# Patient Record
Sex: MALE | Race: WHITE | Employment: STUDENT | URBAN - METROPOLITAN AREA
[De-identification: names, ages, dates, MRNs, and addresses within clinical notes are randomized per-mention and may not be internally consistent; named-entity substitution may affect disease eponyms.]

---

## 2019-09-09 ENCOUNTER — OFFICE VISIT (OUTPATIENT)
Dept: FAMILY MEDICINE CLINIC | Age: 34
End: 2019-09-09
Payer: MEDICARE

## 2019-09-09 VITALS
OXYGEN SATURATION: 97 % | HEIGHT: 72 IN | WEIGHT: 253.4 LBS | SYSTOLIC BLOOD PRESSURE: 120 MMHG | HEART RATE: 81 BPM | DIASTOLIC BLOOD PRESSURE: 86 MMHG | TEMPERATURE: 97.7 F | RESPIRATION RATE: 12 BRPM | BODY MASS INDEX: 34.32 KG/M2

## 2019-09-09 DIAGNOSIS — Z87.828 HISTORY OF DISRUPTION OF MEDIAL COLLATERAL LIGAMENT: ICD-10-CM

## 2019-09-09 DIAGNOSIS — E11.9 TYPE 2 DIABETES MELLITUS WITHOUT COMPLICATION, WITHOUT LONG-TERM CURRENT USE OF INSULIN (HCC): ICD-10-CM

## 2019-09-09 DIAGNOSIS — S39.012A STRAIN OF LUMBAR REGION, INITIAL ENCOUNTER: ICD-10-CM

## 2019-09-09 DIAGNOSIS — E11.9 TYPE 2 DIABETES MELLITUS WITHOUT COMPLICATION, WITHOUT LONG-TERM CURRENT USE OF INSULIN (HCC): Primary | ICD-10-CM

## 2019-09-09 PROBLEM — R00.1 BRADYCARDIA: Status: ACTIVE | Noted: 2019-01-10

## 2019-09-09 LAB
ALBUMIN SERPL-MCNC: 4.5 G/DL (ref 3.5–4.6)
ALP BLD-CCNC: 56 U/L (ref 35–104)
ALT SERPL-CCNC: 40 U/L (ref 0–41)
ANION GAP SERPL CALCULATED.3IONS-SCNC: 13 MEQ/L (ref 9–15)
AST SERPL-CCNC: 20 U/L (ref 0–40)
BASOPHILS ABSOLUTE: 0.1 K/UL (ref 0–0.2)
BASOPHILS RELATIVE PERCENT: 0.7 %
BILIRUB SERPL-MCNC: 0.7 MG/DL (ref 0.2–0.7)
BUN BLDV-MCNC: 11 MG/DL (ref 6–20)
CALCIUM SERPL-MCNC: 9.4 MG/DL (ref 8.5–9.9)
CHLORIDE BLD-SCNC: 105 MEQ/L (ref 95–107)
CHOLESTEROL, TOTAL: 175 MG/DL (ref 0–199)
CO2: 25 MEQ/L (ref 20–31)
CREAT SERPL-MCNC: 0.92 MG/DL (ref 0.7–1.2)
CREATININE URINE: 130.8 MG/DL
EOSINOPHILS ABSOLUTE: 0.4 K/UL (ref 0–0.7)
EOSINOPHILS RELATIVE PERCENT: 5.3 %
GFR AFRICAN AMERICAN: >60
GFR NON-AFRICAN AMERICAN: >60
GLOBULIN: 2.6 G/DL (ref 2.3–3.5)
GLUCOSE BLD-MCNC: 95 MG/DL (ref 70–99)
HBA1C MFR BLD: 5.3 % (ref 4.8–5.9)
HCT VFR BLD CALC: 50.1 % (ref 42–52)
HDLC SERPL-MCNC: 39 MG/DL (ref 40–59)
HEMOGLOBIN: 16.8 G/DL (ref 14–18)
LDL CHOLESTEROL CALCULATED: 117 MG/DL (ref 0–129)
LYMPHOCYTES ABSOLUTE: 2.1 K/UL (ref 1–4.8)
LYMPHOCYTES RELATIVE PERCENT: 26.7 %
MCH RBC QN AUTO: 31.7 PG (ref 27–31.3)
MCHC RBC AUTO-ENTMCNC: 33.5 % (ref 33–37)
MCV RBC AUTO: 94.5 FL (ref 80–100)
MICROALBUMIN UR-MCNC: <1.2 MG/DL
MICROALBUMIN/CREAT UR-RTO: NORMAL MG/G (ref 0–30)
MONOCYTES ABSOLUTE: 0.7 K/UL (ref 0.2–0.8)
MONOCYTES RELATIVE PERCENT: 9 %
NEUTROPHILS ABSOLUTE: 4.5 K/UL (ref 1.4–6.5)
NEUTROPHILS RELATIVE PERCENT: 58.3 %
PDW BLD-RTO: 12.8 % (ref 11.5–14.5)
PLATELET # BLD: 222 K/UL (ref 130–400)
POTASSIUM SERPL-SCNC: 4.2 MEQ/L (ref 3.4–4.9)
RBC # BLD: 5.31 M/UL (ref 4.7–6.1)
SODIUM BLD-SCNC: 143 MEQ/L (ref 135–144)
TOTAL PROTEIN: 7.1 G/DL (ref 6.3–8)
TRIGL SERPL-MCNC: 95 MG/DL (ref 0–150)
WBC # BLD: 7.7 K/UL (ref 4.8–10.8)

## 2019-09-09 PROCEDURE — 3044F HG A1C LEVEL LT 7.0%: CPT | Performed by: NURSE PRACTITIONER

## 2019-09-09 PROCEDURE — 4004F PT TOBACCO SCREEN RCVD TLK: CPT | Performed by: NURSE PRACTITIONER

## 2019-09-09 PROCEDURE — 99214 OFFICE O/P EST MOD 30 MIN: CPT | Performed by: NURSE PRACTITIONER

## 2019-09-09 PROCEDURE — G8427 DOCREV CUR MEDS BY ELIG CLIN: HCPCS | Performed by: NURSE PRACTITIONER

## 2019-09-09 PROCEDURE — 2022F DILAT RTA XM EVC RTNOPTHY: CPT | Performed by: NURSE PRACTITIONER

## 2019-09-09 PROCEDURE — G8417 CALC BMI ABV UP PARAM F/U: HCPCS | Performed by: NURSE PRACTITIONER

## 2019-09-09 RX ORDER — CYCLOBENZAPRINE HCL 10 MG
10 TABLET ORAL 3 TIMES DAILY PRN
Qty: 30 TABLET | Refills: 1 | Status: SHIPPED | OUTPATIENT
Start: 2019-09-09

## 2019-09-09 RX ORDER — IBUPROFEN 800 MG/1
TABLET ORAL
Qty: 90 TABLET | Refills: 1 | Status: SHIPPED | OUTPATIENT
Start: 2019-09-09

## 2019-09-09 ASSESSMENT — ENCOUNTER SYMPTOMS
PHOTOPHOBIA: 0
CHEST TIGHTNESS: 0
ABDOMINAL PAIN: 0
APNEA: 0
SHORTNESS OF BREATH: 0
NAUSEA: 0
CONSTIPATION: 0
COUGH: 0
BACK PAIN: 1
DIARRHEA: 0
ABDOMINAL DISTENTION: 0
VOMITING: 0

## 2019-09-09 ASSESSMENT — PATIENT HEALTH QUESTIONNAIRE - PHQ9
SUM OF ALL RESPONSES TO PHQ QUESTIONS 1-9: 0
SUM OF ALL RESPONSES TO PHQ QUESTIONS 1-9: 0
SUM OF ALL RESPONSES TO PHQ9 QUESTIONS 1 & 2: 0
2. FEELING DOWN, DEPRESSED OR HOPELESS: 0
1. LITTLE INTEREST OR PLEASURE IN DOING THINGS: 0

## 2019-09-09 NOTE — PROGRESS NOTES
Scores 9/9/2019   PHQ2 Score 0   PHQ9 Score 0     Interpretation of Total Score Depression Severity: 1-4 = Minimal depression, 5-9 = Mild depression, 10-14 = Moderatedepression, 15-19 = Moderately severe depression, 20-27 = Severe depression    If any problems reported, how difficult have these problems made it for you to get things done or get along with other people? 0 - Normal difficulty    Social History    Social History     Socioeconomic History    Marital status: Single     Spouse name: Not on file    Number of children: Not on file    Years of education: Not on file    Highest education level: Not on file   Occupational History    Not on file   Social Needs    Financial resource strain: Not on file    Food insecurity:     Worry: Not on file     Inability: Not on file    Transportation needs:     Medical: Not on file     Non-medical: Not on file   Tobacco Use    Smoking status: Current Every Day Smoker     Packs/day: 0.50     Years: 15.00     Pack years: 7.50     Types: Cigarettes    Smokeless tobacco: Never Used   Substance and Sexual Activity    Alcohol use: Yes     Comment: social    Drug use: Yes     Types: Marijuana    Sexual activity: Yes     Partners: Female   Lifestyle    Physical activity:     Days per week: Not on file     Minutes per session: Not on file    Stress: Not on file   Relationships    Social connections:     Talks on phone: Not on file     Gets together: Not on file     Attends Sikhism service: Not on file     Active member of club or organization: Not on file     Attends meetings of clubs or organizations: Not on file     Relationship status: Not on file    Intimate partner violence:     Fear of current or ex partner: Not on file     Emotionally abused: Not on file     Physically abused: Not on file     Forced sexual activity: Not on file   Other Topics Concern    Not on file   Social History Narrative    Not on file       With whom do you live?  Lives with aunt

## 2019-11-21 ENCOUNTER — TELEPHONE (OUTPATIENT)
Dept: ADMINISTRATIVE | Age: 34
End: 2019-11-21

## 2023-11-15 ENCOUNTER — HOSPITAL ENCOUNTER (EMERGENCY)
Facility: HOSPITAL | Age: 38
Discharge: HOME | End: 2023-11-15
Payer: MEDICAID

## 2023-11-15 ENCOUNTER — APPOINTMENT (OUTPATIENT)
Dept: RADIOLOGY | Facility: HOSPITAL | Age: 38
End: 2023-11-15
Payer: MEDICAID

## 2023-11-15 VITALS
TEMPERATURE: 97.7 F | DIASTOLIC BLOOD PRESSURE: 88 MMHG | WEIGHT: 260 LBS | SYSTOLIC BLOOD PRESSURE: 124 MMHG | RESPIRATION RATE: 18 BRPM | HEART RATE: 61 BPM | OXYGEN SATURATION: 97 % | BODY MASS INDEX: 34.46 KG/M2 | HEIGHT: 73 IN

## 2023-11-15 DIAGNOSIS — M79.643 TENDERNESS OF ANATOMICAL SNUFFBOX: ICD-10-CM

## 2023-11-15 DIAGNOSIS — S60.221A CONTUSION OF RIGHT HAND, INITIAL ENCOUNTER: Primary | ICD-10-CM

## 2023-11-15 PROCEDURE — 73130 X-RAY EXAM OF HAND: CPT | Mod: RIGHT SIDE | Performed by: RADIOLOGY

## 2023-11-15 PROCEDURE — 99285 EMERGENCY DEPT VISIT HI MDM: CPT | Mod: 25

## 2023-11-15 PROCEDURE — 73130 X-RAY EXAM OF HAND: CPT | Mod: RT

## 2023-11-15 PROCEDURE — 94761 N-INVAS EAR/PLS OXIMETRY MLT: CPT

## 2023-11-15 PROCEDURE — 99283 EMERGENCY DEPT VISIT LOW MDM: CPT | Mod: 25

## 2023-11-15 PROCEDURE — 96372 THER/PROPH/DIAG INJ SC/IM: CPT

## 2023-11-15 PROCEDURE — 2500000004 HC RX 250 GENERAL PHARMACY W/ HCPCS (ALT 636 FOR OP/ED): Performed by: HOME HEALTH

## 2023-11-15 RX ORDER — KETOROLAC TROMETHAMINE 30 MG/ML
30 INJECTION, SOLUTION INTRAMUSCULAR; INTRAVENOUS ONCE
Status: COMPLETED | OUTPATIENT
Start: 2023-11-15 | End: 2023-11-15

## 2023-11-15 RX ADMIN — KETOROLAC TROMETHAMINE 30 MG: 30 INJECTION, SOLUTION INTRAMUSCULAR at 21:22

## 2023-11-15 ASSESSMENT — PAIN SCALES - GENERAL: PAINLEVEL_OUTOF10: 4

## 2023-11-15 ASSESSMENT — LIFESTYLE VARIABLES
HAVE PEOPLE ANNOYED YOU BY CRITICIZING YOUR DRINKING: NO
EVER HAD A DRINK FIRST THING IN THE MORNING TO STEADY YOUR NERVES TO GET RID OF A HANGOVER: NO
HAVE YOU EVER FELT YOU SHOULD CUT DOWN ON YOUR DRINKING: NO
EVER FELT BAD OR GUILTY ABOUT YOUR DRINKING: NO

## 2023-11-15 ASSESSMENT — COLUMBIA-SUICIDE SEVERITY RATING SCALE - C-SSRS
2. HAVE YOU ACTUALLY HAD ANY THOUGHTS OF KILLING YOURSELF?: NO
1. IN THE PAST MONTH, HAVE YOU WISHED YOU WERE DEAD OR WISHED YOU COULD GO TO SLEEP AND NOT WAKE UP?: NO
6. HAVE YOU EVER DONE ANYTHING, STARTED TO DO ANYTHING, OR PREPARED TO DO ANYTHING TO END YOUR LIFE?: NO

## 2023-11-15 ASSESSMENT — PAIN - FUNCTIONAL ASSESSMENT: PAIN_FUNCTIONAL_ASSESSMENT: 0-10

## 2023-11-16 ENCOUNTER — OFFICE VISIT (OUTPATIENT)
Dept: ORTHOPEDIC SURGERY | Facility: CLINIC | Age: 38
End: 2023-11-16
Payer: MEDICAID

## 2023-11-16 DIAGNOSIS — S63.91XA HAND SPRAIN, RIGHT, INITIAL ENCOUNTER: ICD-10-CM

## 2023-11-16 DIAGNOSIS — S60.221A CONTUSION OF RIGHT HAND, INITIAL ENCOUNTER: Primary | ICD-10-CM

## 2023-11-16 PROCEDURE — 99213 OFFICE O/P EST LOW 20 MIN: CPT | Performed by: FAMILY MEDICINE

## 2023-11-16 RX ORDER — METHYLPREDNISOLONE 4 MG/1
TABLET ORAL
Qty: 1 EACH | Refills: 0 | Status: SHIPPED | OUTPATIENT
Start: 2023-11-16 | End: 2024-06-10 | Stop reason: ALTCHOICE

## 2023-11-16 RX ORDER — NAPROXEN 500 MG/1
500 TABLET ORAL
Qty: 180 TABLET | Refills: 0 | Status: SHIPPED | OUTPATIENT
Start: 2023-11-16 | End: 2023-11-17

## 2023-11-16 ASSESSMENT — PAIN SCALES - GENERAL: PAINLEVEL_OUTOF10: 4

## 2023-11-16 NOTE — ED PROVIDER NOTES
HPI   Chief Complaint   Patient presents with    Hand Injury     Pt states he accidentally slammed his right hand in the car door and then accidentally hit it again at work. Pt c/o hand pain that radiates up his arm. Pt able to move a few of his fingers, pulse intact.        Patient is a 38-year-old male presenting to the ED with right hand injury.  Patient states that couple days ago he smashed his hand in his car door.  States that while he was at work today he then hit the same hand.  Currently Dors is pain along his fourth and fifth fingers as well as the middle aspect of his hand.  States that pain shoots up his arm.  Patient's not had thing for pain at this time.  No numbness or tingling distally.  Patient is right-hand dominant                          No data recorded                Patient History   No past medical history on file.  No past surgical history on file.  No family history on file.  Social History     Tobacco Use    Smoking status: Not on file    Smokeless tobacco: Not on file   Substance Use Topics    Alcohol use: Not on file    Drug use: Not on file       Physical Exam   ED Triage Vitals [11/15/23 1940]   Temp Heart Rate Resp BP   36.5 °C (97.7 °F) 67 18 (!) 170/100      SpO2 Temp src Heart Rate Source Patient Position   98 % -- -- --      BP Location FiO2 (%)     -- --       Physical Exam  Vitals reviewed.   Constitutional:       Appearance: Normal appearance.   HENT:      Head: Normocephalic.      Nose: Nose normal.      Mouth/Throat:      Mouth: Mucous membranes are moist.      Pharynx: Oropharynx is clear.   Eyes:      Extraocular Movements: Extraocular movements intact.      Conjunctiva/sclera: Conjunctivae normal.      Pupils: Pupils are equal, round, and reactive to light.   Cardiovascular:      Rate and Rhythm: Normal rate and regular rhythm.      Pulses: Normal pulses.      Heart sounds: Normal heart sounds.   Pulmonary:      Effort: Pulmonary effort is normal.      Breath sounds:  Normal breath sounds. No wheezing, rhonchi or rales.   Musculoskeletal:         General: Normal range of motion.      Cervical back: Normal range of motion.      Comments:  flocked range of motion in digits 1 through 5 in the right hand right wrist and right elbow.  Patient no visible ecchymosis, erythema or edema.  Cap refill less than 2 seconds.  Radial pulse 2+.  Patient has reproducible tenderness in the snuffbox and along the metacarpals of the fourth and fifth digits.   Skin:     General: Skin is warm.      Capillary Refill: Capillary refill takes less than 2 seconds.   Neurological:      General: No focal deficit present.      Mental Status: He is alert. Mental status is at baseline.   Psychiatric:         Mood and Affect: Mood normal.         Behavior: Behavior normal.       XR hand right 3+ views   Final Result   No acute fracture.             MACRO:   None        Signed by: Rosita Camacho 11/15/2023 8:57 PM   Dictation workstation:   [id number]          ED Course & MDM   Diagnoses as of 11/15/23 2119   Contusion of right hand, initial encounter   Tenderness of anatomical snuffbox       Medical Decision Making  Independent Historians:  patient    MDM:   38 female presenting to the ED with right hand injury/pain.  The patient states that 2 to 3 days ago he slammed his hand in his car door.  States that yesterday at work he hit his hand causing worsening pain.  Has not had thing for symptoms at this time.  States that pain is radiating along his proximal arm.  No numbness or tingling distally.  On exam patient is resting comfortably.  No obvious deformity.  Patient's cap refill less than 2 seconds radial pulse 2+.  Full active range of motion in digits 1 through 5 in the right hand, right wrist and right elbow.  No obvious swelling, ecchymosis or erythema.  Patient has reproducible tenderness when palpating the metacarpals of the fourth and fifth digits and the snuffbox region.  X-ray ordered to further  evaluate symptoms.    X-ray interpreted by radiologist suggest no acute fracture.  Discussed these fines with the patient which he understands.  Discussed with patient that symptoms consistent with contusion.  Advised RICE therapy.  Advised take NSAIDs just Tylenol as needed for pain control.  Discussed with the patient that due to his snuffbox tenderness this is always concern for scaphoid fracture in which she should follow-up with [facility] in a week for repeat imaging to rule out occult fracture.  Patient agrees to this plan has no further questions.  Patient stable for discharge.  Diagnosing the patient with right hand contusion and snuffbox tenderness    DDx/Differential: fracture, contusion    Diagnosis: right hand contusion, snuffbox tenderness        Dictation Disclaimer: Due to voice recognition software, sound alike and misspelled words may be contained in documentation. If you have any questions please contact me.            Procedure  Procedures     Celestino Walker PA-C  11/15/23 4750

## 2023-11-16 NOTE — PROGRESS NOTES
Acute Injury New Patient Visit    CC:   Chief Complaint   Patient presents with    Right Hand - Pain     Slammed Rt Hand in car door 23, Xrays @ [facility]       HPI: Raffi is a 38 y.o.male who presents today with new complaints of pain discomfort and swelling to the right dorsum of the hand.  He unfortunately slammed his hand in the car door back on the .  Was seen and evaluated in the emergency department yesterday where they gave him a shot of Toradol and gave him a very simple splint which is not providing a lot of symptomatic relief.  X-rays were taken that did not show any fracture.  He presents here today for further evaluation.  He states some mild intermittent numbness and tingling with the swelling.        Review of Systems   GENERAL: Negative for malaise, significant weight loss, fever  MUSCULOSKELETAL: See HPI  NEURO: Intermittent right upper extremity for numbness / tingling     Past Medical History  History reviewed. No pertinent past medical history.    Medication review  Medication Documentation Review Audit       Reviewed by Cole C Budinsky, MD (Physician) on 23 at 1327      Medication Order Taking? Sig Documenting Provider Last Dose Status   ketorolac (Toradol) injection 30 mg [id number]   Celestino Walker PA-C   11/15/23 2122                    Allergies  No Known Allergies    Social History  Social History     Socioeconomic History    Marital status: Single     Spouse name: Not on file    Number of children: Not on file    Years of education: Not on file    Highest education level: Not on file   Occupational History    Not on file   Tobacco Use    Smoking status: Every Day     Types: Cigarettes    Smokeless tobacco: Never   Substance and Sexual Activity    Alcohol use: Not on file    Drug use: Not on file    Sexual activity: Not on file   Other Topics Concern    Not on file   Social History Narrative    Not on file     Social Determinants of Health     Financial Resource  Strain: Not on file   Food Insecurity: Not on file   Transportation Needs: Not on file   Physical Activity: Not on file   Stress: Not on file   Social Connections: Not on file   Intimate Partner Violence: Not on file   Housing Stability: Not on file       Surgical History  History reviewed. No pertinent surgical history.    Physical Exam:  GENERAL:  Patient is awake, alert, and oriented to person place and time.  Patient appears well nourished and well kept.  Affect Calm, Not Acutely Distressed.  HEENT:  Normocephalic, Atraumatic, EOMI  CARDIOVASCULAR:  Hemodynamically stable.  RESPIRATORY:  Normal respirations with unlabored breathing.  NEURO: Gross sensation intact to the upper extremities bilaterally.  Extremity: Right hand and wrist exam demonstrates skin which is warm pink well-perfused there is no obvious focal deficits seen.  He can gently wrist extend wrist flex without much of an issue he has a full ability to extend and flex all of the digits.  There is soft tissue swelling noted about the second through fifth digits.  He has a little bit of tenderness over the volar aspect of the extensor crease.   strength is intact but weak no redness warmth erythema no bruising.  Minimal discomfort in the soft tissues at the snuffbox.  Forearm compartments are otherwise soft compressible no pain at the elbow.      Diagnostics: Previous images personally reviewed and discussed with the patient as below  XR hand right 3+ views  Narrative: Interpreted By:  Rosita Camacho,   STUDY:  XR HAND RIGHT 3+ VIEWS; ;  11/15/2023 8:27 pm      INDICATION:  Signs/Symptoms:smash injury.      COMPARISON:  Right hand x-ray 05/21/2022      ACCESSION NUMBER(S):  [id number]      ORDERING CLINICIAN:  CORRINE MISTRY      FINDINGS:  Three views of the hand are obtained.      No acute fracture or dislocation. Chronic fracture deformity of the  ulnar styloid is redemonstrated. Bones are well mineralized.  Soft  tissues are unremarkable.       Impression: No acute fracture.          MACRO:  None      Signed by: Rosita Quentinmarycarmen 11/15/2023 8:57 PM  Dictation workstation:   [id number]            Procedure: None  Procedures    Assessment:   Problem List Items Addressed This Visit    None  Visit Diagnoses       Contusion of right hand, initial encounter    -  Primary    Relevant Medications    methylPREDNISolone (Medrol Dospak) 4 mg tablets    naproxen (EC Naprosyn) 500 mg EC tablet    Hand sprain, right, initial encounter        Relevant Medications    methylPREDNISolone (Medrol Dospak) 4 mg tablets    naproxen (EC Naprosyn) 500 mg EC tablet             Plan: At this time we will call in a contusion and swelling of the right hand.  He was offered a steroid pack anti-inflammatory and a trauma splint.  Recommended that he work on gentle range of motion and strength with active stretching flexion extension recovery activiti  He will be given a work note if necessary for work as tolerated.  We will see him back in 2 weeks for repeat evaluation repeat x-rays 3 views of the right hand to rule out any occult fracture..  Independent review of the x-rays does not demonstrate any obvious fracture at the scaphoid.  Orders Placed This Encounter    methylPREDNISolone (Medrol Dospak) 4 mg tablets    naproxen (EC Naprosyn) 500 mg EC tablet      At the conclusion of the visit there were no further questions by the patient/family regarding their plan of care.  Patient was instructed to call or return with any issues, questions, or concerns regarding their injury and/or treatment plan described above.     11/16/23 at 1:28 PM - Cole C Budinsky, MD    Office: [phone]    This note was prepared using voice recognition software.  The details of this note are correct and have been reviewed, and corrected to the best of my ability.  Some grammatical errors may persist related to the Dragon software.

## 2023-11-17 DIAGNOSIS — S60.221A CONTUSION OF RIGHT HAND, INITIAL ENCOUNTER: Primary | ICD-10-CM

## 2023-11-17 RX ORDER — NAPROXEN 500 MG/1
500 TABLET ORAL
Qty: 28 TABLET | Refills: 0 | Status: SHIPPED | OUTPATIENT
Start: 2023-11-17 | End: 2023-12-01

## 2023-12-01 ENCOUNTER — APPOINTMENT (OUTPATIENT)
Dept: ORTHOPEDIC SURGERY | Facility: CLINIC | Age: 38
End: 2023-12-01
Payer: MEDICAID

## 2024-06-10 ENCOUNTER — OFFICE VISIT (OUTPATIENT)
Dept: PRIMARY CARE | Facility: CLINIC | Age: 39
End: 2024-06-10
Payer: MEDICAID

## 2024-06-10 VITALS
HEIGHT: 72 IN | WEIGHT: 245 LBS | DIASTOLIC BLOOD PRESSURE: 82 MMHG | HEART RATE: 56 BPM | TEMPERATURE: 98.8 F | SYSTOLIC BLOOD PRESSURE: 122 MMHG | BODY MASS INDEX: 33.18 KG/M2 | OXYGEN SATURATION: 97 %

## 2024-06-10 DIAGNOSIS — Z13.29 SCREENING FOR THYROID DISORDER: ICD-10-CM

## 2024-06-10 DIAGNOSIS — E55.9 VITAMIN D DEFICIENCY: ICD-10-CM

## 2024-06-10 DIAGNOSIS — M25.561 CHRONIC PAIN OF RIGHT KNEE: Primary | ICD-10-CM

## 2024-06-10 DIAGNOSIS — Z13.220 SCREENING FOR LIPID DISORDERS: ICD-10-CM

## 2024-06-10 DIAGNOSIS — Z13.89 SCREENING FOR BLOOD OR PROTEIN IN URINE: ICD-10-CM

## 2024-06-10 DIAGNOSIS — M25.641 STIFFNESS OF RIGHT HAND JOINT: ICD-10-CM

## 2024-06-10 DIAGNOSIS — G89.29 CHRONIC PAIN OF RIGHT KNEE: Primary | ICD-10-CM

## 2024-06-10 DIAGNOSIS — I10 ESSENTIAL (PRIMARY) HYPERTENSION: Primary | ICD-10-CM

## 2024-06-10 DIAGNOSIS — G89.29 CHRONIC PAIN OF RIGHT KNEE: ICD-10-CM

## 2024-06-10 DIAGNOSIS — M25.561 CHRONIC PAIN OF RIGHT KNEE: ICD-10-CM

## 2024-06-10 PROBLEM — E66.9 OBESITY (BMI 30-39.9): Status: ACTIVE | Noted: 2024-06-10

## 2024-06-10 PROBLEM — S83.90XA SPRAIN OF KNEE: Status: ACTIVE | Noted: 2024-06-10

## 2024-06-10 PROBLEM — M23.8X1 OTHER INTERNAL DERANGEMENTS OF RIGHT KNEE: Status: ACTIVE | Noted: 2024-06-10

## 2024-06-10 PROBLEM — F17.200 CURRENT EVERY DAY SMOKER: Status: ACTIVE | Noted: 2024-06-10

## 2024-06-10 PROBLEM — S83.91XA SPRAIN OF RIGHT KNEE: Status: ACTIVE | Noted: 2024-06-10

## 2024-06-10 PROCEDURE — 99204 OFFICE O/P NEW MOD 45 MIN: CPT | Performed by: INTERNAL MEDICINE

## 2024-06-10 RX ORDER — BUPRENORPHINE HYDROCHLORIDE AND NALOXONE HYDROCHLORIDE DIHYDRATE 8; 2 MG/1; MG/1
2 TABLET SUBLINGUAL ONCE
COMMUNITY
Start: 2024-05-21

## 2024-06-10 ASSESSMENT — PATIENT HEALTH QUESTIONNAIRE - PHQ9
SUM OF ALL RESPONSES TO PHQ9 QUESTIONS 1 AND 2: 0
1. LITTLE INTEREST OR PLEASURE IN DOING THINGS: NOT AT ALL
2. FEELING DOWN, DEPRESSED OR HOPELESS: NOT AT ALL

## 2024-06-10 ASSESSMENT — ENCOUNTER SYMPTOMS
OCCASIONAL FEELINGS OF UNSTEADINESS: 0
LOSS OF SENSATION IN FEET: 0
DEPRESSION: 0

## 2024-07-01 ASSESSMENT — ENCOUNTER SYMPTOMS
COUGH: 0
DYSURIA: 0
SORE THROAT: 0
ABDOMINAL PAIN: 0
ACTIVITY CHANGE: 0
MYALGIAS: 0
LIGHT-HEADEDNESS: 0

## 2024-07-01 NOTE — PROGRESS NOTES
"CHIEF COMPLAINT:    Chief Complaint   Patient presents with    New Patient Visit     New patient establish PCP. C/o chronic right knee pain. Stated that he had a cortisone shot in right knee last year.        HISTORY OF PRESENT ILLNESS:  Raffi Cerrato  is a pleasant 38 y.o. male Here to establish primary care physician.  He is on Suboxone.  Otherwise he is doing well.  He complained about bilateral knee pain.  No trauma or injury.  He also has stiffness on the small joints.  Also patient is annual blood work to establish baseline.        Review of Systems   Constitutional:  Negative for activity change.   HENT:  Negative for congestion and sore throat.    Respiratory:  Negative for cough.    Cardiovascular:  Negative for chest pain.   Gastrointestinal:  Negative for abdominal pain.   Endocrine: Negative for polyuria.   Genitourinary:  Negative for dysuria.   Musculoskeletal:  Negative for myalgias.   Skin:  Negative for rash.   Neurological:  Negative for light-headedness.   Psychiatric/Behavioral:  Negative for behavioral problems.      Visit Vitals  /82 (BP Location: Left arm, Patient Position: Sitting, BP Cuff Size: Adult)   Pulse 56   Temp 37.1 °C (98.8 °F) (Temporal)   Ht 1.816 m (5' 11.5\")   Wt 111 kg (245 lb)   SpO2 97%   BMI 33.69 kg/m²   Smoking Status Every Day   BSA 2.37 m²         Wt Readings from Last 10 Encounters:   06/10/24 111 kg (245 lb)   11/15/23 118 kg (260 lb)   10/21/20 109 kg (240 lb)       Physical Exam  Vitals and nursing note reviewed.   Constitutional:       Appearance: Normal appearance.   HENT:      Head: Normocephalic.      Right Ear: Tympanic membrane normal.      Left Ear: Tympanic membrane normal.      Nose: Nose normal.      Mouth/Throat:      Mouth: Mucous membranes are moist.   Cardiovascular:      Rate and Rhythm: Normal rate and regular rhythm.      Pulses: Normal pulses.      Heart sounds: No murmur heard.  Pulmonary:      Effort: No respiratory distress.      Breath " sounds: Normal breath sounds.   Abdominal:      Palpations: Abdomen is soft.   Musculoskeletal:      Cervical back: Neck supple.      Right lower leg: No edema.      Left lower leg: No edema.   Skin:     General: Skin is warm.      Findings: No rash.   Neurological:      General: No focal deficit present.      Mental Status: He is alert and oriented to person, place, and time.   Psychiatric:         Mood and Affect: Mood normal.          RECENT LABS:  Lab Results   Component Value Date    WBC 8.9 06/19/2023    HGB 15.3 06/19/2023    HCT 44.2 06/19/2023     06/19/2023    ALT 59 (H) 06/19/2023    AST 30 06/19/2023     06/19/2023    K 3.8 06/19/2023     06/19/2023    CREATININE 1.04 06/19/2023    BUN 16 06/19/2023    CO2 24 06/19/2023    INR 1.0 06/19/2023       IMAGING:  === 11/15/23 ===    XR HAND 3+ VIEWS RIGHT    - Impression -  No acute fracture.    === 06/21/19 ===    CT SINUS FACIAL BONES W CONTRAST    Soft tissue thickening and fat stranding noted in the left cheek and  infraorbital region, likely related to cellulitis, no discrete fluid  collection or abscess noted.     MEDICATIONS:   Current Outpatient Medications   Medication Instructions    buprenorphine-naloxone (Suboxone) 8-2 mg SL tablet 2 tablets, sublingual, Once, And one quarter tablets        TODAY'S VISIT  DX:   1. Chronic pain of right knee  CBC and Auto Differential    Comprehensive Metabolic Panel    XR hip right with pelvis when performed 2 or 3 views      2. Screening for lipid disorders  Lipid Panel      3. Screening for thyroid disorder  TSH with reflex to Free T4 if abnormal    Triiodothyronine, Free      4. Vitamin D deficiency  Vitamin D 25-Hydroxy,Total (for eval of Vitamin D levels)      5. Screening for blood or protein in urine  Urinalysis with Reflex Microscopic      6. Stiffness of right hand joint  C-reactive protein    Sedimentation Rate           MEDICAL DECISION MAKING:  - The current and active medical co  morbidities have been considered.   - Recent lab work and relevant imaging studies have been reviewed.    - Relevant correspondence/notes from other specialty consultants were reviewed.    - Next Follow up in 3 months  - Patient was given treatment as per above plan

## 2024-07-08 ENCOUNTER — HOSPITAL ENCOUNTER (EMERGENCY)
Facility: HOSPITAL | Age: 39
Discharge: HOME | End: 2024-07-08
Payer: MEDICAID

## 2024-07-08 VITALS
HEIGHT: 73 IN | HEART RATE: 89 BPM | BODY MASS INDEX: 29.16 KG/M2 | OXYGEN SATURATION: 96 % | SYSTOLIC BLOOD PRESSURE: 128 MMHG | TEMPERATURE: 97.5 F | WEIGHT: 220 LBS | DIASTOLIC BLOOD PRESSURE: 60 MMHG | RESPIRATION RATE: 20 BRPM

## 2024-07-08 DIAGNOSIS — L55.9 SUNBURN: Primary | ICD-10-CM

## 2024-07-08 PROCEDURE — 99281 EMR DPT VST MAYX REQ PHY/QHP: CPT

## 2024-07-08 ASSESSMENT — LIFESTYLE VARIABLES
EVER FELT BAD OR GUILTY ABOUT YOUR DRINKING: NO
HAVE PEOPLE ANNOYED YOU BY CRITICIZING YOUR DRINKING: NO
HAVE YOU EVER FELT YOU SHOULD CUT DOWN ON YOUR DRINKING: NO
TOTAL SCORE: 0
EVER HAD A DRINK FIRST THING IN THE MORNING TO STEADY YOUR NERVES TO GET RID OF A HANGOVER: NO

## 2024-07-08 ASSESSMENT — PAIN - FUNCTIONAL ASSESSMENT: PAIN_FUNCTIONAL_ASSESSMENT: 0-10

## 2024-07-08 ASSESSMENT — PAIN SCALES - GENERAL: PAINLEVEL_OUTOF10: 7

## 2024-07-08 NOTE — ED PROVIDER NOTES
"HPI   Chief Complaint   Patient presents with    Sunburn     \"Got sunburned last week and it is worse.\"       This is a 38-year-old male presenting for evaluation of sunburn over his bilateral shins that occurred a week ago when he was kayaking and did not put sunblock on his legs.  He reports pain despite taking ibuprofen and Tylenol topical lidocaine cream and aloe.  He reports occasional nausea.      History provided by:  Patient   used: No                        Diogo Coma Scale Score: 15                     Patient History   History reviewed. No pertinent past medical history.  Past Surgical History:   Procedure Laterality Date    HERNIA REPAIR      TONSILECTOMY, ADENOIDECTOMY, BILATERAL MYRINGOTOMY AND TUBES       Family History   Problem Relation Name Age of Onset    Kidney cancer Mother      Other (rsd) Mother      Fibromyalgia Mother      Kidney cancer Father       Social History     Tobacco Use    Smoking status: Every Day     Current packs/day: 0.50     Average packs/day: 0.5 packs/day for 24.4 years (12.2 ttl pk-yrs)     Types: Cigarettes     Start date: 1/31/2000    Smokeless tobacco: Never   Vaping Use    Vaping status: Never Used   Substance Use Topics    Alcohol use: Yes     Comment: social    Drug use: Yes     Types: Marijuana       Physical Exam   ED Triage Vitals [07/08/24 1812]   Temperature Heart Rate Respirations BP   36.4 °C (97.5 °F) 89 16 159/72      Pulse Ox Temp Source Heart Rate Source Patient Position   97 % Temporal Monitor Sitting      BP Location FiO2 (%)     Right arm --       Physical Exam  Constitutional:       General: He is not in acute distress.     Appearance: Normal appearance. He is not toxic-appearing.   Cardiovascular:      Rate and Rhythm: Normal rate and regular rhythm.      Pulses: Normal pulses.      Heart sounds: Normal heart sounds.   Pulmonary:      Effort: Pulmonary effort is normal.      Breath sounds: Normal breath sounds.   Skin:     " Comments: Superficial sunburn of bilateral anterior tib-fib's.  No peeling.  No disclamation.  No crepitus.  No pain out of proportion.  Neurovascular intact throughout.   Neurological:      Mental Status: He is alert and oriented to person, place, and time.         ED Course & MDM   Diagnoses as of 07/08/24 1830   Sunburn       Medical Decision Making  Patient advised to continue supportive care as he has been doing.  Instructed to return to the nearest ED if any concerns or new or worsening symptoms. Patient verbalized understanding and agreement with plan. Discharged in stable condition.      Disclaimer: This note was dictated using speech recognition software. An attempt at proofreading was made to minimize errors. Minor errors in transcription may be present. Please call if questions.        Procedure  Procedures     Dmitri Foley PA-C  07/08/24 7037

## 2024-07-22 ENCOUNTER — APPOINTMENT (OUTPATIENT)
Dept: PRIMARY CARE | Facility: CLINIC | Age: 39
End: 2024-07-22
Payer: MEDICAID

## 2024-09-23 ENCOUNTER — HOSPITAL ENCOUNTER (EMERGENCY)
Facility: HOSPITAL | Age: 39
Discharge: HOME | End: 2024-09-23
Payer: MEDICAID

## 2024-09-23 VITALS
OXYGEN SATURATION: 99 % | SYSTOLIC BLOOD PRESSURE: 122 MMHG | HEART RATE: 63 BPM | RESPIRATION RATE: 16 BRPM | TEMPERATURE: 98.2 F | DIASTOLIC BLOOD PRESSURE: 63 MMHG

## 2024-09-23 DIAGNOSIS — J02.9 VIRAL PHARYNGITIS: Primary | ICD-10-CM

## 2024-09-23 LAB
FLUAV RNA RESP QL NAA+PROBE: NOT DETECTED
FLUBV RNA RESP QL NAA+PROBE: NOT DETECTED
S PYO DNA THROAT QL NAA+PROBE: NOT DETECTED
SARS-COV-2 RNA RESP QL NAA+PROBE: NOT DETECTED

## 2024-09-23 PROCEDURE — 99283 EMERGENCY DEPT VISIT LOW MDM: CPT

## 2024-09-23 PROCEDURE — 87635 SARS-COV-2 COVID-19 AMP PRB: CPT | Performed by: PHYSICIAN ASSISTANT

## 2024-09-23 PROCEDURE — 2500000001 HC RX 250 WO HCPCS SELF ADMINISTERED DRUGS (ALT 637 FOR MEDICARE OP): Performed by: PHYSICIAN ASSISTANT

## 2024-09-23 PROCEDURE — 2500000005 HC RX 250 GENERAL PHARMACY W/O HCPCS: Performed by: PHYSICIAN ASSISTANT

## 2024-09-23 PROCEDURE — 87651 STREP A DNA AMP PROBE: CPT | Performed by: PHYSICIAN ASSISTANT

## 2024-09-23 RX ORDER — ONDANSETRON 4 MG/1
4 TABLET, ORALLY DISINTEGRATING ORAL EVERY 8 HOURS PRN
Qty: 9 TABLET | Refills: 0 | Status: SHIPPED | OUTPATIENT
Start: 2024-09-23 | End: 2024-09-26

## 2024-09-23 RX ORDER — LIDOCAINE HYDROCHLORIDE 20 MG/ML
1.25 SOLUTION OROPHARYNGEAL ONCE
Status: COMPLETED | OUTPATIENT
Start: 2024-09-23 | End: 2024-09-23

## 2024-09-23 RX ORDER — ONDANSETRON 4 MG/1
4 TABLET, ORALLY DISINTEGRATING ORAL ONCE
Status: COMPLETED | OUTPATIENT
Start: 2024-09-23 | End: 2024-09-23

## 2024-09-23 RX ORDER — IBUPROFEN 600 MG/1
TABLET ORAL
Qty: 20 TABLET | Refills: 0 | Status: SHIPPED | OUTPATIENT
Start: 2024-09-23

## 2024-09-23 RX ORDER — IBUPROFEN 400 MG/1
400 TABLET ORAL ONCE
Status: COMPLETED | OUTPATIENT
Start: 2024-09-23 | End: 2024-09-23

## 2024-09-23 ASSESSMENT — PAIN DESCRIPTION - PROGRESSION: CLINICAL_PROGRESSION: NOT CHANGED

## 2024-09-23 ASSESSMENT — LIFESTYLE VARIABLES
HAVE YOU EVER FELT YOU SHOULD CUT DOWN ON YOUR DRINKING: NO
EVER HAD A DRINK FIRST THING IN THE MORNING TO STEADY YOUR NERVES TO GET RID OF A HANGOVER: NO
EVER FELT BAD OR GUILTY ABOUT YOUR DRINKING: NO
TOTAL SCORE: 0
HAVE PEOPLE ANNOYED YOU BY CRITICIZING YOUR DRINKING: NO

## 2024-09-23 ASSESSMENT — PAIN - FUNCTIONAL ASSESSMENT: PAIN_FUNCTIONAL_ASSESSMENT: 0-10

## 2024-09-23 ASSESSMENT — PAIN SCALES - GENERAL: PAINLEVEL_OUTOF10: 7

## 2024-09-23 NOTE — ED PROVIDER NOTES
HPI   Chief Complaint   Patient presents with    Sore Throat    Nausea       39-year-old male presents to the emergency department for complaints of a sore throat.  He states his son has been in and out of urgent care and the hospital for a sore throat, fevers and nausea and he has been on 5 different antibiotics.  Patient states he has had a sore throat since yesterday and it feels worse today.  He states he feels nauseated as well.  He denies any difficulty handling secretions or difficulty swallowing but does endorse painful swallowing.              Patient History   History reviewed. No pertinent past medical history.  Past Surgical History:   Procedure Laterality Date    HERNIA REPAIR      TONSILECTOMY, ADENOIDECTOMY, BILATERAL MYRINGOTOMY AND TUBES       Family History   Problem Relation Name Age of Onset    Kidney cancer Mother      Other (rsd) Mother      Fibromyalgia Mother      Kidney cancer Father       Social History     Tobacco Use    Smoking status: Every Day     Current packs/day: 0.50     Average packs/day: 0.5 packs/day for 24.6 years (12.3 ttl pk-yrs)     Types: Cigarettes     Start date: 1/31/2000    Smokeless tobacco: Never   Vaping Use    Vaping status: Never Used   Substance Use Topics    Alcohol use: Yes     Comment: social    Drug use: Yes     Types: Marijuana       Physical Exam   ED Triage Vitals [09/23/24 1354]   Temperature Heart Rate Respirations BP   36.8 °C (98.2 °F) 65 16 113/78      Pulse Ox Temp src Heart Rate Source Patient Position   99 % -- -- --      BP Location FiO2 (%)     -- --       Physical Exam  Vitals and nursing note reviewed.   Constitutional:       General: He is not in acute distress.  HENT:      Head: Atraumatic.      Mouth/Throat:      Mouth: Mucous membranes are moist.      Pharynx: Oropharynx is clear. Posterior oropharyngeal erythema present.   Eyes:      Extraocular Movements: Extraocular movements intact.      Conjunctiva/sclera: Conjunctivae normal.       Pupils: Pupils are equal, round, and reactive to light.   Cardiovascular:      Rate and Rhythm: Normal rate and regular rhythm.      Pulses: Normal pulses.   Pulmonary:      Effort: Pulmonary effort is normal. No respiratory distress.      Breath sounds: Normal breath sounds.   Abdominal:      General: There is no distension.      Palpations: Abdomen is soft.      Tenderness: There is no abdominal tenderness. There is no guarding or rebound.   Musculoskeletal:         General: No deformity.      Cervical back: Neck supple.   Skin:     General: Skin is warm and dry.   Neurological:      Mental Status: He is alert and oriented to person, place, and time. Mental status is at baseline.      Cranial Nerves: No cranial nerve deficit.      Sensory: No sensory deficit.      Motor: No weakness.   Psychiatric:         Mood and Affect: Mood normal.         Behavior: Behavior normal.           ED Course & MDM   Diagnoses as of 09/23/24 1653   Viral pharyngitis                 No data recorded                                 Medical Decision Making  39-year-old male presents to the emergency department for a sore throat x 2 days.  On my exam, he is speaking normally and handling his secretions normally.  His pharynx is erythematous with no tonsillar exudates, uvula deviation.   Flu, COVID, strep are negative.  Patient prescribed ibuprofen and Zofran.  Discussed results with patient and/or family/friend and recommended close follow up with primary care or specialist.  Reviewed return precautions at length.  I answered all questions.           Procedure  Procedures     Zo Andre PA-C  09/23/24 [id number]

## 2024-09-23 NOTE — Clinical Note
Raffi Cerrato was seen and treated in our emergency department on 9/23/2024.  He may return to work on 09/25/2024.       If you have any questions or concerns, please don't hesitate to call.      Zo Andre PA-C

## 2024-12-02 ENCOUNTER — APPOINTMENT (OUTPATIENT)
Dept: PRIMARY CARE | Facility: CLINIC | Age: 39
End: 2024-12-02
Payer: MEDICAID

## 2024-12-08 ENCOUNTER — APPOINTMENT (OUTPATIENT)
Dept: RADIOLOGY | Facility: HOSPITAL | Age: 39
End: 2024-12-08
Payer: MEDICAID

## 2024-12-08 ENCOUNTER — HOSPITAL ENCOUNTER (EMERGENCY)
Facility: HOSPITAL | Age: 39
Discharge: HOME | End: 2024-12-08
Payer: MEDICAID

## 2024-12-08 VITALS
HEIGHT: 73 IN | SYSTOLIC BLOOD PRESSURE: 137 MMHG | BODY MASS INDEX: 27.83 KG/M2 | HEART RATE: 83 BPM | RESPIRATION RATE: 16 BRPM | TEMPERATURE: 97.7 F | WEIGHT: 210 LBS | OXYGEN SATURATION: 97 % | DIASTOLIC BLOOD PRESSURE: 76 MMHG

## 2024-12-08 DIAGNOSIS — S43.402A SPRAIN OF LEFT SHOULDER, UNSPECIFIED SHOULDER SPRAIN TYPE, INITIAL ENCOUNTER: ICD-10-CM
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
## 2024-12-08 DIAGNOSIS — M89.8X1 PAIN OF LEFT CLAVICLE: ICD-10-CM

## 2024-12-08 PROCEDURE — 73030 X-RAY EXAM OF SHOULDER: CPT | Mod: LT

## 2024-12-08 PROCEDURE — 99284 EMERGENCY DEPT VISIT MOD MDM: CPT

## 2024-12-08 PROCEDURE — 96372 THER/PROPH/DIAG INJ SC/IM: CPT | Performed by: NURSE PRACTITIONER

## 2024-12-08 PROCEDURE — 73030 X-RAY EXAM OF SHOULDER: CPT | Mod: LEFT SIDE | Performed by: RADIOLOGY

## 2024-12-08 PROCEDURE — 2500000004 HC RX 250 GENERAL PHARMACY W/ HCPCS (ALT 636 FOR OP/ED): Performed by: NURSE PRACTITIONER

## 2024-12-08 PROCEDURE — 73000 X-RAY EXAM OF COLLAR BONE: CPT | Mod: LEFT SIDE | Performed by: RADIOLOGY

## 2024-12-08 PROCEDURE — 2500000001 HC RX 250 WO HCPCS SELF ADMINISTERED DRUGS (ALT 637 FOR MEDICARE OP): Performed by: NURSE PRACTITIONER

## 2024-12-08 PROCEDURE — 73000 X-RAY EXAM OF COLLAR BONE: CPT | Mod: LT

## 2024-12-08 RX ORDER — ORPHENADRINE CITRATE 30 MG/ML
60 INJECTION INTRAMUSCULAR; INTRAVENOUS ONCE
Status: COMPLETED | OUTPATIENT
Start: 2024-12-08 | End: 2024-12-08

## 2024-12-08 RX ORDER — PREDNISONE 10 MG/1
40 TABLET ORAL DAILY
Qty: 20 TABLET | Refills: 0 | Status: SHIPPED | OUTPATIENT
Start: 2024-12-08 | End: 2024-12-13

## 2024-12-08 RX ORDER — TIZANIDINE 4 MG/1
4 TABLET ORAL EVERY 6 HOURS PRN
Qty: 40 TABLET | Refills: 0 | Status: SHIPPED | OUTPATIENT
Start: 2024-12-08 | End: 2024-12-18

## 2024-12-08 RX ORDER — FAMOTIDINE 20 MG/1
20 TABLET, FILM COATED ORAL NIGHTLY
Qty: 7 TABLET | Refills: 0 | Status: SHIPPED | OUTPATIENT
Start: 2024-12-08 | End: 2024-12-15

## 2024-12-08 RX ORDER — DIAZEPAM 2 MG/1
2 TABLET ORAL ONCE
Status: COMPLETED | OUTPATIENT
Start: 2024-12-08 | End: 2024-12-08

## 2024-12-08 RX ORDER — KETOROLAC TROMETHAMINE 30 MG/ML
30 INJECTION, SOLUTION INTRAMUSCULAR; INTRAVENOUS ONCE
Status: COMPLETED | OUTPATIENT
Start: 2024-12-08 | End: 2024-12-08

## 2024-12-08 ASSESSMENT — LIFESTYLE VARIABLES
HAVE PEOPLE ANNOYED YOU BY CRITICIZING YOUR DRINKING: NO
TOTAL SCORE: 0
HAVE YOU EVER FELT YOU SHOULD CUT DOWN ON YOUR DRINKING: NO
EVER HAD A DRINK FIRST THING IN THE MORNING TO STEADY YOUR NERVES TO GET RID OF A HANGOVER: NO
EVER FELT BAD OR GUILTY ABOUT YOUR DRINKING: NO

## 2024-12-08 ASSESSMENT — PAIN - FUNCTIONAL ASSESSMENT: PAIN_FUNCTIONAL_ASSESSMENT: 0-10

## 2024-12-08 ASSESSMENT — PAIN SCALES - GENERAL
PAINLEVEL_OUTOF10: 6
PAINLEVEL_OUTOF10: 3

## 2024-12-08 NOTE — DISCHARGE INSTRUCTIONS
Use the steroids  And then use ibuprofen if desired    Muscle relaxers    Followup with ortho referral in

## 2024-12-08 NOTE — ED PROVIDER NOTES
[facility]  ED  Encounter Note  Admit Date/RoomTime: 2024  4:27 PM  [place]: [place] [address] [address]  NAME: Raffi Cerrato  : 1985  MRN: [id number]     Chief Complaint:  Fall (Pt fell 2 days ago on ice c/o left shoulder pain)    HISTORY OF PRESENT ILLNESS        Raffi Cerrato is a 39 y.o. male who presents to the ED for evaluation of left shoulder clavicle pain after he fell a few days ago. He did a trip and fall and tried to grab something not to fall but he did fall. He really cannot take any meds since he is on subutex. Nothing makes better or worse. No other medications. He has limited mobility, right hand dominant. Pain 6/10.  He works in construction and uses both hands.    ROS   Pertinent positives and negatives are stated within HPI, all other systems reviewed and are negative.    Past Medical History:  has no past medical history on file.    Surgical History:  has a past surgical history that includes Hernia repair and Tonsilectomy, adenoidectomy, bilateral myringotomy and tubes.    Social History:  reports that he has been smoking cigarettes. He started smoking about 24 years ago. He has a 12.4 pack-year smoking history. He has never used smokeless tobacco. He reports current alcohol use. He reports current drug use. Drug: Marijuana.    Family History: family history includes Fibromyalgia in his mother; Kidney cancer in his father and mother; rsd in his mother.     Allergies: Patient has no known allergies.    PHYSICAL EXAM   Oxygen Saturation Interpretation: Normal.    Physical Exam  Constitutional/General: Alert and oriented x3, well appearing, non toxic  HEENT:  NC/NT. PERRLA.  Airway patent.  Neck: Supple, full ROM. No midline vertebral tenderness or crepitus.   Respiratory: Lung sounds clear to auscultation bilaterally. No wheezes, rhonchi or stridor. Not in respiratory distress.  CV:  Regular rate. Regular rhythm. No murmurs or rubs. 2+ distal pulses.  GI:   Abdomen soft, non-tender, non-distended. +BS. No rebound, guarding, or rigidity. No pulsatile masses.  Musculoskeletal: Moves all extremities x 4. Warm and well perfused. Capillary refill <3 seconds  Integument: Skin warm and dry. No rashes.   Neurologic: Alert and oriented with no focal deficits, symmetric strength 5/5 in the upper and lower extremities bilaterally.  Psychiatric: Normal affect.//left shoulder pain with palpitation.     Lab / Imaging Results   (All laboratory and radiology results have been personally reviewed by myself)  Labs:    Imaging:  All Radiology results interpreted by Radiologist unless otherwise noted.  XR clavicle left   Final Result   Unremarkable left clavicle.   Signed by Celestino Snow MD      XR shoulder left 2+ views   Final Result   No evidence for acute injury.   Signed by Celestino Snow MD          ED Course / Medical Decision Making     Medications   ketorolac (Toradol) injection 30 mg (30 mg intramuscular Given 12/8/24 1653)   orphenadrine (Norflex) injection 60 mg (60 mg intramuscular Given 12/8/24 1653)   diazePAM (Valium) tablet 2 mg (2 mg oral Given 12/8/24 1653)     Diagnoses as of 12/09/24 1514   Fall, initial encounter   Pain of left clavicle   Sprain of left shoulder, unspecified shoulder sprain type, initial encounter     EDM:        Raffi Cerrato is a 39 y.o. male who presents to the ED for evaluation of left shoulder clavicle pain after he fell a few days ago. He did a trip and fall and tried to grab something not to fall but he did fall. He really cannot take any meds since he is on subutex. Nothing makes better or worse. No other medications. He has limited mobility, right hand dominant. Pain 6/10.  He works in construction and uses both hands.    ED Course stable  He is eager to see ortho provider asap to avoid missing work.  Registration is going to see if they can get him an appt this week  Will give short steroid taper, no nsaids, aspirin, alleve while on  this medication.  Muscle relaxers as needed.  Ddx: fall left shoulder pain/sprain    Plan of Care/Counseling:  I reviewed today's visit with the patient  in addition to providing specific details for the plan of care and counseling regarding the diagnosis and prognosis.  Questions are answered at this time and are agreeable with the plan.    ASSESSMENT     1. Fall, initial encounter    2. Pain of left clavicle    3. Sprain of left shoulder, unspecified shoulder sprain type, initial encounter      PLAN   Home Referral Orthopedics, Advised to return for signs of head injury, weakness, numbness or tingling to extremities, incontinence, and Advised to return for worsening or additional problems such as abdominal or chest pain  Diagnostic tests were reviewed and questions answered. Diagnosis, care plan and treatment options were discussed. The patient understand instructions and will follow up as directed.  Condition stable  The patient was given verbal follow-up instructions  Patient condition is stable    New Medications     New Medications Ordered This Visit   Medications    ketorolac (Toradol) injection 30 mg    orphenadrine (Norflex) injection 60 mg    diazePAM (Valium) tablet 2 mg    tiZANidine (Zanaflex) 4 mg tablet     Sig: Take 1 tablet (4 mg) by mouth every 6 hours if needed for muscle spasms for up to 10 days.     Dispense:  40 tablet     Refill:  0    predniSONE (Deltasone) 10 mg tablet     Sig: Take 4 tablets (40 mg) by mouth once daily for 5 days.     Dispense:  20 tablet     Refill:  0    famotidine (Pepcid) 20 mg tablet     Sig: Take 1 tablet (20 mg) by mouth once daily at bedtime for 7 days.     Dispense:  7 tablet     Refill:  0     Electronically signed by MAGDALENO Eldridge-CNP     **This report was transcribed using voice recognition software. Every effort was made to ensure accuracy; however, inadvertent computerized transcription errors may be present.  END OF ED PROVIDER NOTE     Shweta KENNEDY  Yuriy, MAGDALENO-CNP  12/09/24 1510

## 2024-12-08 NOTE — Clinical Note
Raffi Cerrato was seen and treated in our emergency department on 12/8/2024.  He may return to work on 12/11/2024.  Can return mon or Tuesday if feeling better     If you have any questions or concerns, please don't hesitate to call.      Shweta Yan, APRN-CNP

## 2024-12-09 ENCOUNTER — OFFICE VISIT (OUTPATIENT)
Dept: ORTHOPEDIC SURGERY | Facility: CLINIC | Age: 39
End: 2024-12-09
Payer: MEDICAID

## 2024-12-09 ENCOUNTER — APPOINTMENT (OUTPATIENT)
Dept: ORTHOPEDIC SURGERY | Facility: CLINIC | Age: 39
End: 2024-12-09
Payer: MEDICAID

## 2024-12-09 DIAGNOSIS — S46.012A ROTATOR CUFF STRAIN, LEFT, INITIAL ENCOUNTER: ICD-10-CM

## 2024-12-09 DIAGNOSIS — M89.8X1 PAIN OF LEFT CLAVICLE: ICD-10-CM

## 2024-12-09 DIAGNOSIS — S43.402A SPRAIN OF LEFT SHOULDER, UNSPECIFIED SHOULDER SPRAIN TYPE, INITIAL ENCOUNTER: ICD-10-CM

## 2024-12-09 DIAGNOSIS — S29.011A PECTORALIS MUSCLE STRAIN, INITIAL ENCOUNTER: ICD-10-CM

## 2024-12-09 DIAGNOSIS — S46.812A STRAIN OF LEFT TRAPEZIUS MUSCLE, INITIAL ENCOUNTER: Primary | ICD-10-CM
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
## 2024-12-09 PROCEDURE — L3670 SO ACRO/CLAV CAN WEB PRE OTS: HCPCS

## 2024-12-09 PROCEDURE — 99214 OFFICE O/P EST MOD 30 MIN: CPT | Performed by: STUDENT IN AN ORGANIZED HEALTH CARE EDUCATION/TRAINING PROGRAM

## 2024-12-09 RX ORDER — NAPROXEN 500 MG/1
500 TABLET ORAL
Qty: 28 TABLET | Refills: 0 | Status: SHIPPED | OUTPATIENT
Start: 2024-12-09 | End: 2024-12-23

## 2024-12-09 NOTE — PROGRESS NOTES
Acute Injury Established Patient Visit    HPI: Raffi is a 39 y.o.male who presents today with new complaints of left shoulder injury.  States that he slipped and fell on 12/6/2024 getting into the car.  He tried to hold him in the car door, but held on his studies he could, later feeling some pain across the anterior shoulder and chest.  He denies any swelling and bruising.  He denies any neck pain.  He is right-hand dominant.  He was seen at the emergency department and found to have no fracture dislocations.  He was started on prednisone and tizanidine.  He has not taken these yet.    Plan: For this left trapezius strain, rotator cuff strain, and pectoralis major strain, we will place him in a sling for a few days, coming out for early range of motion, starting physical therapy, naproxen, to be started after the prednisone, to be taken with food, and follow-up in 3 to 4 weeks.  If he is not better at that time, I would consider MRI versus CSI.    Assessment:   Problem List Items Addressed This Visit    None  Visit Diagnoses       Strain of left trapezius muscle, initial encounter    -  Primary    Relevant Medications    naproxen (Naprosyn) 500 mg tablet    Other Relevant Orders    Referral to Physical Therapy    Sling    Fall, initial encounter        Pain of left clavicle        Sprain of left shoulder, unspecified shoulder sprain type, initial encounter        Pectoralis muscle strain, initial encounter        Relevant Medications    naproxen (Naprosyn) 500 mg tablet    Other Relevant Orders    Referral to Physical Therapy    Sling    Rotator cuff strain, left, initial encounter        Relevant Medications    naproxen (Naprosyn) 500 mg tablet    Other Relevant Orders    Referral to Physical Therapy    Sling            Diagnostics: Reviewed all relevant imaging including x-ray, MRI, CT, and US.      Procedure:  Procedures    Physical Exam:  GENERAL:  No obvious acute distress.  NEURO:  Distally neurovascularly  intact.  Sensation intact to light touch.  Extremity: Left shoulder exam shows:  Skin is intact;  No erythema or warmth;  No edema or ecchymosis;  TENDER to palpation over the anterior chest wall in the anterior shoulder joint line;  TENDERNESS to trapezius muscle;  No clinical signs of infection;  Can forward flex to 90 degrees;  Abduction to 90 degrees;  External rotation from neutral at 75 degrees;  Internal rotation at the level of T10;  POSITIVE signs of impingement with Hansen or Neer's test;  Negative empty can test;  Negative crossarm test;  No pain over the AC joint;  Negative Coos's test;  Negative sulcus sign;  Negative anterior apprehension's test; and  Neurovascularly intact.       Orders Placed This Encounter    Sling    Referral to Physical Therapy    naproxen (Naprosyn) 500 mg tablet      At the conclusion of the visit there were no further questions by the patient/family regarding their plan of care.  Patient was instructed to call or return with any issues, questions, or concerns regarding their injury and/or treatment plan described above.     12/09/24 at 12:32 PM - Lauri Cárdenas,     Office: [phone]    This note was prepared using voice recognition software.  The details of this note are correct and have been reviewed, and corrected to the best of my ability.  Some grammatical errors may persist related to the Dragon software.

## 2025-01-02 ENCOUNTER — APPOINTMENT (OUTPATIENT)
Dept: ORTHOPEDIC SURGERY | Facility: CLINIC | Age: 40
End: 2025-01-02
Payer: MEDICAID

## 2025-01-09 ENCOUNTER — APPOINTMENT (OUTPATIENT)
Dept: RADIOLOGY | Facility: HOSPITAL | Age: 40
End: 2025-01-09
Payer: MEDICAID

## 2025-01-09 ENCOUNTER — HOSPITAL ENCOUNTER (EMERGENCY)
Facility: HOSPITAL | Age: 40
Discharge: HOME | End: 2025-01-09
Payer: MEDICAID

## 2025-01-09 VITALS
TEMPERATURE: 97.3 F | WEIGHT: 207 LBS | DIASTOLIC BLOOD PRESSURE: 66 MMHG | SYSTOLIC BLOOD PRESSURE: 123 MMHG | BODY MASS INDEX: 27.43 KG/M2 | HEIGHT: 73 IN | RESPIRATION RATE: 18 BRPM | HEART RATE: 62 BPM | OXYGEN SATURATION: 100 %

## 2025-01-09 DIAGNOSIS — M25.522 LEFT ELBOW PAIN: Primary | ICD-10-CM

## 2025-01-09 DIAGNOSIS — R20.2 PARESTHESIAS IN LEFT HAND: ICD-10-CM

## 2025-01-09 PROCEDURE — 2500000001 HC RX 250 WO HCPCS SELF ADMINISTERED DRUGS (ALT 637 FOR MEDICARE OP)

## 2025-01-09 PROCEDURE — 73080 X-RAY EXAM OF ELBOW: CPT | Mod: LEFT SIDE | Performed by: RADIOLOGY

## 2025-01-09 PROCEDURE — 73090 X-RAY EXAM OF FOREARM: CPT | Mod: LT

## 2025-01-09 PROCEDURE — 73080 X-RAY EXAM OF ELBOW: CPT | Mod: LT

## 2025-01-09 PROCEDURE — 99284 EMERGENCY DEPT VISIT MOD MDM: CPT

## 2025-01-09 PROCEDURE — 73090 X-RAY EXAM OF FOREARM: CPT | Mod: LEFT SIDE | Performed by: RADIOLOGY

## 2025-01-09 RX ORDER — OXYCODONE AND ACETAMINOPHEN 5; 325 MG/1; MG/1
1 TABLET ORAL ONCE
Status: COMPLETED | OUTPATIENT
Start: 2025-01-09 | End: 2025-01-09

## 2025-01-09 RX ADMIN — OXYCODONE HYDROCHLORIDE AND ACETAMINOPHEN 1 TABLET: 5; 325 TABLET ORAL at 11:00

## 2025-01-09 ASSESSMENT — LIFESTYLE VARIABLES
HAVE YOU EVER FELT YOU SHOULD CUT DOWN ON YOUR DRINKING: NO
EVER FELT BAD OR GUILTY ABOUT YOUR DRINKING: NO
EVER HAD A DRINK FIRST THING IN THE MORNING TO STEADY YOUR NERVES TO GET RID OF A HANGOVER: NO
TOTAL SCORE: 0
HAVE PEOPLE ANNOYED YOU BY CRITICIZING YOUR DRINKING: NO

## 2025-01-09 ASSESSMENT — COLUMBIA-SUICIDE SEVERITY RATING SCALE - C-SSRS
1. IN THE PAST MONTH, HAVE YOU WISHED YOU WERE DEAD OR WISHED YOU COULD GO TO SLEEP AND NOT WAKE UP?: NO
2. HAVE YOU ACTUALLY HAD ANY THOUGHTS OF KILLING YOURSELF?: NO
6. HAVE YOU EVER DONE ANYTHING, STARTED TO DO ANYTHING, OR PREPARED TO DO ANYTHING TO END YOUR LIFE?: NO

## 2025-01-09 ASSESSMENT — PAIN - FUNCTIONAL ASSESSMENT: PAIN_FUNCTIONAL_ASSESSMENT: 0-10

## 2025-01-09 ASSESSMENT — PAIN DESCRIPTION - LOCATION: LOCATION: ELBOW

## 2025-01-09 ASSESSMENT — PAIN SCALES - GENERAL
PAINLEVEL_OUTOF10: 8
PAINLEVEL_OUTOF10: 4

## 2025-01-09 ASSESSMENT — PAIN DESCRIPTION - ORIENTATION: ORIENTATION: LEFT

## 2025-01-09 NOTE — Clinical Note
Raffi Cerrato was seen and treated in our emergency department on 1/9/2025.  He may return to work on 01/10/2025.       If you have any questions or concerns, please don't hesitate to call.      Ernst Santos PA-C

## 2025-01-09 NOTE — ED PROVIDER NOTES
"HPI   Chief Complaint   Patient presents with    Elbow Pain     \"After being at the gym I could not starighten my elbow.  That was 2 days ago and now it is just getting worse.\"       39-year-old male presents to the emergency department for evaluation of left elbow pain x 2 days.  Patient states he was working out of the gym and was doing upper body.  He was using a tricep extension machine when the pain began.  He does not recall a specific instance, but states after doing this exercise he had elbow pain, and numbness in his fourth and fifth digits.  Patient states he came today because the pain has stayed about the same, and the numbness in his fingers has not gone away.  The pain in his elbow is worse with movement of the arm in all directions. Patient denies CP, SOB, fever, chills, body aches, dysuria or urinary symptoms, shoulder pain, and flank pain.      History provided by:  Patient   used: No        Patient History   History reviewed. No pertinent past medical history.  Past Surgical History:   Procedure Laterality Date    HERNIA REPAIR      TONSILECTOMY, ADENOIDECTOMY, BILATERAL MYRINGOTOMY AND TUBES       Family History   Problem Relation Name Age of Onset    Kidney cancer Mother      Other (rsd) Mother      Fibromyalgia Mother      Kidney cancer Father       Social History     Tobacco Use    Smoking status: Every Day     Current packs/day: 0.50     Average packs/day: 0.5 packs/day for 24.9 years (12.5 ttl pk-yrs)     Types: Cigarettes     Start date: 1/31/2000    Smokeless tobacco: Never   Vaping Use    Vaping status: Never Used   Substance Use Topics    Alcohol use: Yes     Comment: social    Drug use: Yes     Types: Marijuana       Physical Exam   ED Triage Vitals [01/09/25 1017]   Temperature Heart Rate Respirations BP   36.3 °C (97.3 °F) 58 18 126/76      Pulse Ox Temp Source Heart Rate Source Patient Position   99 % Temporal Monitor Sitting      BP Location FiO2 (%)     Right arm " --       Physical Exam  Vitals and nursing note reviewed.   Constitutional:       General: He is not in acute distress.     Appearance: Normal appearance. He is not ill-appearing, toxic-appearing or diaphoretic.   HENT:      Nose: Nose normal.      Mouth/Throat:      Mouth: Mucous membranes are moist.      Pharynx: Oropharynx is clear.   Eyes:      Extraocular Movements: Extraocular movements intact.      Pupils: Pupils are equal, round, and reactive to light.   Cardiovascular:      Rate and Rhythm: Normal rate and regular rhythm.      Pulses: Normal pulses.      Heart sounds: Normal heart sounds. No murmur heard.     No friction rub. No gallop.   Pulmonary:      Effort: Pulmonary effort is normal.      Breath sounds: Normal breath sounds. No wheezing.   Chest:      Chest wall: No tenderness.   Abdominal:      General: Abdomen is flat. Bowel sounds are normal.      Palpations: Abdomen is soft.      Tenderness: There is no abdominal tenderness. There is no right CVA tenderness or left CVA tenderness.   Musculoskeletal:      Right shoulder: Normal.      Left shoulder: Normal.      Right upper arm: Normal.      Left upper arm: Normal.      Right elbow: Normal.      Left elbow: No swelling, deformity or effusion. Decreased range of motion. Tenderness present in medial epicondyle.      Right forearm: Normal.      Left forearm: Tenderness present. No swelling, edema, deformity or bony tenderness.      Right wrist: Normal.      Left wrist: No swelling, tenderness, snuff box tenderness or crepitus. Normal range of motion. Normal pulse.      Right hand: Normal.      Left hand: No tenderness. Normal range of motion. Decreased strength. Decreased sensation of the ulnar distribution. Normal sensation of the median distribution and radial distribution. Normal capillary refill. Normal pulse.      Cervical back: Normal range of motion. No tenderness.   Lymphadenopathy:      Cervical: No cervical adenopathy.   Neurological:       Mental Status: He is alert.       ED Course & MDM   Diagnoses as of 01/09/25 1230   Left elbow pain   Paresthesias in left hand     Labs Reviewed - No data to display     XR elbow left 3+ views   Final Result   No acute osseous abnormality.             MACRO:   None        Signed by: Kilo Monterroso 1/9/2025 11:08 AM   Dictation workstation:   [id number]      XR forearm left 2 views   Final Result   No acute osseous abnormality.             MACRO:   None        Signed by: Kilo Monterroso 1/9/2025 11:08 AM   Dictation workstation:   [id number]            No data recorded                           Medical Decision Making  Ddx: Fx, dislocation, ulnar radiculopathy, tendon injury, compartment syndrome  39-year-old male presents to the emergency department for evaluation of left elbow pain x 2 days.  Vital signs stable.    On exam patient is nontoxic appearing, in no acute distress.  Heart sounds normal with regular rate and rhythm.  Lung clear to auscultation bilaterally.  Abdomen soft and nontender.  No decreased ROM or tenderness left shoulder or humerus.  Patient does have tenderness over the medial epicondyle of the left elbow as well as left medial forearm.  There is no warmth, redness, ecchymosis or discoloration or pain out of portion giving low suspicion for compartment syndrome.  Patient does have pain with range of motion with supination, pronation, flexion and extension.  Strong pulses with good capillary refill of the left hand.  Patient does endorse numbness in ulnar pattern, involving the fourth and fifth digits of the left hand.  No snuffbox tenderness.  There is also decreased strength in the left hand when compared to the right, associated with the patient's pain.  Patient was given Percocet for pain, upon reevaluation there was improvement in his pain.  X-rays of the left elbow and forearm were obtained which revealed no acute osseous abnormality, fracture, dislocation or joint effusion.    Patient  will be discharged with a referral to the same-day orthopedic clinic for further evaluation of his left elbow pain and paresthesias in his hand.  He was given a work note, so that he is able to head directly there after leaving here.  He states he has a ride.  Patient was educated on differential diagnoses, as well as given very strict return precautions.  He states he will go directly to the orthopedic clinic.  He was advised to follow-up with his PCP.  All questions and concerns were addressed and answered prior to discharge.  Patient discharged in a stable condition.    Amount and/or Complexity of Data Reviewed  External Data Reviewed: notes.  Radiology: ordered. Decision-making details documented in ED Course.        Procedure  Procedures     Ernst Santos PA-C  01/09/25 7230

## 2025-08-05 ENCOUNTER — HOSPITAL ENCOUNTER (INPATIENT)
Age: 40
LOS: 3 days | Discharge: HOME | End: 2025-08-08
Attending: PSYCHIATRY & NEUROLOGY | Admitting: PSYCHIATRY & NEUROLOGY
Payer: COMMERCIAL

## 2025-08-05 DIAGNOSIS — F32.A DEPRESSION, UNSPECIFIED DEPRESSION TYPE: Primary | ICD-10-CM

## 2025-08-05 LAB
ALBUMIN SERPL-MCNC: 4.9 G/DL (ref 3.5–4.6)
ALP SERPL-CCNC: 65 U/L (ref 35–104)
ALT SERPL-CCNC: 12 U/L (ref 0–41)
AMPHET UR QL SCN: POSITIVE
ANION GAP SERPL CALCULATED.3IONS-SCNC: 12 MEQ/L (ref 9–15)
APAP SERPL-MCNC: <5 UG/ML (ref 10–30)
AST SERPL-CCNC: 15 U/L (ref 0–40)
BARBITURATES UR QL SCN: ABNORMAL
BASOPHILS # BLD: 0.1 K/UL (ref 0–0.2)
BASOPHILS NFR BLD: 0.8 %
BENZODIAZ UR QL SCN: ABNORMAL
BILIRUB SERPL-MCNC: 0.5 MG/DL (ref 0.2–0.7)
BILIRUB UR QL STRIP: NEGATIVE
BUN SERPL-MCNC: 11 MG/DL (ref 6–20)
CALCIUM SERPL-MCNC: 9.4 MG/DL (ref 8.5–9.9)
CANNABINOIDS UR QL SCN: POSITIVE
CHLORIDE SERPL-SCNC: 104 MEQ/L (ref 95–107)
CHOLEST SERPL-MCNC: 126 MG/DL (ref 0–199)
CK SERPL-CCNC: 129 U/L (ref 0–190)
CLARITY UR: CLEAR
CO2 SERPL-SCNC: 25 MEQ/L (ref 20–31)
COCAINE UR QL SCN: ABNORMAL
COLOR UR: YELLOW
CREAT SERPL-MCNC: 0.85 MG/DL (ref 0.7–1.2)
DRUG SCREEN COMMENT UR-IMP: ABNORMAL
EOSINOPHIL # BLD: 0.5 K/UL (ref 0–0.7)
EOSINOPHIL NFR BLD: 7.7 %
ERYTHROCYTE [DISTWIDTH] IN BLOOD BY AUTOMATED COUNT: 12.1 % (ref 11.5–14.5)
ETHANOL PERCENT: NORMAL G/DL
ETHANOLAMINE SERPL-MCNC: <10 MG/DL (ref 0–0.08)
FENTANYL SCREEN, URINE: ABNORMAL
GLOBULIN SER CALC-MCNC: 2.8 G/DL (ref 2.3–3.5)
GLUCOSE SERPL-MCNC: 99 MG/DL (ref 70–99)
GLUCOSE UR STRIP-MCNC: NEGATIVE MG/DL
HCT VFR BLD AUTO: 46.9 % (ref 42–52)
HDLC SERPL-MCNC: 43 MG/DL (ref 40–59)
HGB BLD-MCNC: 16.4 G/DL (ref 14–18)
HGB UR QL STRIP: NEGATIVE
KETONES UR STRIP-MCNC: NEGATIVE MG/DL
LDLC SERPL CALC-MCNC: 69 MG/DL (ref 0–129)
LEUKOCYTE ESTERASE UR QL STRIP: NEGATIVE
LYMPHOCYTES # BLD: 2.1 K/UL (ref 1–4.8)
LYMPHOCYTES NFR BLD: 31.3 %
MCH RBC QN AUTO: 31.2 PG (ref 27–31.3)
MCHC RBC AUTO-ENTMCNC: 35 % (ref 33–37)
MCV RBC AUTO: 89.3 FL (ref 79–92.2)
METHADONE UR QL SCN: POSITIVE
MONOCYTES # BLD: 0.5 K/UL (ref 0.2–0.8)
MONOCYTES NFR BLD: 7.8 %
NEUTROPHILS # BLD: 3.5 K/UL (ref 1.4–6.5)
NEUTS SEG NFR BLD: 52.2 %
NITRITE UR QL STRIP: NEGATIVE
OPIATES UR QL SCN: ABNORMAL
OXYCODONE UR QL SCN: ABNORMAL
PCP UR QL SCN: ABNORMAL
PH UR STRIP: 6.5 [PH] (ref 5–9)
PLATELET # BLD AUTO: 201 K/UL (ref 130–400)
POTASSIUM SERPL-SCNC: 4.6 MEQ/L (ref 3.4–4.9)
PROPOXYPH UR QL SCN: ABNORMAL
PROT SERPL-MCNC: 7.7 G/DL (ref 6.3–8)
PROT UR STRIP-MCNC: NEGATIVE MG/DL
RBC # BLD AUTO: 5.25 M/UL (ref 4.7–6.1)
SALICYLATES SERPL-MCNC: <0.3 MG/DL (ref 15–30)
SODIUM SERPL-SCNC: 141 MEQ/L (ref 135–144)
SP GR UR STRIP: 1.01 (ref 1–1.03)
TRIGL SERPL-MCNC: 72 MG/DL (ref 0–150)
TSH REFLEX: 0.73 UIU/ML (ref 0.44–3.86)
URINE REFLEX TO CULTURE: NORMAL
UROBILINOGEN UR STRIP-ACNC: 0.2 E.U./DL
WBC # BLD AUTO: 6.6 K/UL (ref 4.8–10.8)

## 2025-08-05 PROCEDURE — 90792 PSYCH DIAG EVAL W/MED SRVCS: CPT | Performed by: NURSE PRACTITIONER

## 2025-08-05 PROCEDURE — 84443 ASSAY THYROID STIM HORMONE: CPT

## 2025-08-05 PROCEDURE — 93005 ELECTROCARDIOGRAM TRACING: CPT | Performed by: PSYCHIATRY & NEUROLOGY

## 2025-08-05 PROCEDURE — 80307 DRUG TEST PRSMV CHEM ANLYZR: CPT

## 2025-08-05 PROCEDURE — 99285 EMERGENCY DEPT VISIT HI MDM: CPT

## 2025-08-05 PROCEDURE — 80053 COMPREHEN METABOLIC PANEL: CPT

## 2025-08-05 PROCEDURE — 80179 DRUG ASSAY SALICYLATE: CPT

## 2025-08-05 PROCEDURE — 6370000000 HC RX 637 (ALT 250 FOR IP): Performed by: PSYCHIATRY & NEUROLOGY

## 2025-08-05 PROCEDURE — 85025 COMPLETE CBC W/AUTO DIFF WBC: CPT

## 2025-08-05 PROCEDURE — 82550 ASSAY OF CK (CPK): CPT

## 2025-08-05 PROCEDURE — 6370000000 HC RX 637 (ALT 250 FOR IP)

## 2025-08-05 PROCEDURE — 36415 COLL VENOUS BLD VENIPUNCTURE: CPT

## 2025-08-05 PROCEDURE — 80143 DRUG ASSAY ACETAMINOPHEN: CPT

## 2025-08-05 PROCEDURE — 1240000000 HC EMOTIONAL WELLNESS R&B

## 2025-08-05 PROCEDURE — 80061 LIPID PANEL: CPT

## 2025-08-05 PROCEDURE — 81003 URINALYSIS AUTO W/O SCOPE: CPT

## 2025-08-05 PROCEDURE — 82077 ASSAY SPEC XCP UR&BREATH IA: CPT

## 2025-08-05 PROCEDURE — 83036 HEMOGLOBIN GLYCOSYLATED A1C: CPT

## 2025-08-05 RX ORDER — ACETAMINOPHEN 325 MG/1
650 TABLET ORAL EVERY 4 HOURS PRN
Status: DISCONTINUED | OUTPATIENT
Start: 2025-08-05 | End: 2025-08-05

## 2025-08-05 RX ORDER — TRAZODONE HYDROCHLORIDE 50 MG/1
50 TABLET ORAL NIGHTLY PRN
Status: DISCONTINUED | OUTPATIENT
Start: 2025-08-05 | End: 2025-08-08 | Stop reason: HOSPADM

## 2025-08-05 RX ORDER — BENZTROPINE MESYLATE 1 MG/ML
2 INJECTION, SOLUTION INTRAMUSCULAR; INTRAVENOUS 2 TIMES DAILY PRN
Status: DISCONTINUED | OUTPATIENT
Start: 2025-08-05 | End: 2025-08-08 | Stop reason: HOSPADM

## 2025-08-05 RX ORDER — MAGNESIUM HYDROXIDE/ALUMINUM HYDROXICE/SIMETHICONE 120; 1200; 1200 MG/30ML; MG/30ML; MG/30ML
30 SUSPENSION ORAL PRN
Status: DISCONTINUED | OUTPATIENT
Start: 2025-08-05 | End: 2025-08-05

## 2025-08-05 RX ORDER — HYDROXYZINE HYDROCHLORIDE 50 MG/ML
50 INJECTION, SOLUTION INTRAMUSCULAR EVERY 6 HOURS PRN
Status: DISCONTINUED | OUTPATIENT
Start: 2025-08-05 | End: 2025-08-08 | Stop reason: HOSPADM

## 2025-08-05 RX ORDER — HYDROXYZINE HYDROCHLORIDE 50 MG/ML
50 INJECTION, SOLUTION INTRAMUSCULAR EVERY 6 HOURS PRN
Status: DISCONTINUED | OUTPATIENT
Start: 2025-08-05 | End: 2025-08-05

## 2025-08-05 RX ORDER — HYDROXYZINE PAMOATE 50 MG/1
50 CAPSULE ORAL EVERY 6 HOURS PRN
Status: DISCONTINUED | OUTPATIENT
Start: 2025-08-05 | End: 2025-08-08 | Stop reason: HOSPADM

## 2025-08-05 RX ORDER — MAGNESIUM HYDROXIDE/ALUMINUM HYDROXICE/SIMETHICONE 120; 1200; 1200 MG/30ML; MG/30ML; MG/30ML
30 SUSPENSION ORAL PRN
Status: DISCONTINUED | OUTPATIENT
Start: 2025-08-05 | End: 2025-08-08 | Stop reason: HOSPADM

## 2025-08-05 RX ORDER — NICOTINE 21 MG/24HR
1 PATCH, TRANSDERMAL 24 HOURS TRANSDERMAL DAILY
Status: DISCONTINUED | OUTPATIENT
Start: 2025-08-05 | End: 2025-08-08 | Stop reason: HOSPADM

## 2025-08-05 RX ORDER — HYDROXYZINE PAMOATE 50 MG/1
50 CAPSULE ORAL EVERY 6 HOURS PRN
Status: DISCONTINUED | OUTPATIENT
Start: 2025-08-05 | End: 2025-08-05

## 2025-08-05 RX ORDER — HALOPERIDOL 5 MG/1
5 TABLET ORAL EVERY 6 HOURS PRN
Status: DISCONTINUED | OUTPATIENT
Start: 2025-08-05 | End: 2025-08-05

## 2025-08-05 RX ORDER — LORAZEPAM 1 MG/1
1 TABLET ORAL ONCE
Status: COMPLETED | OUTPATIENT
Start: 2025-08-05 | End: 2025-08-05

## 2025-08-05 RX ORDER — BENZTROPINE MESYLATE 1 MG/ML
2 INJECTION, SOLUTION INTRAMUSCULAR; INTRAVENOUS 2 TIMES DAILY PRN
Status: DISCONTINUED | OUTPATIENT
Start: 2025-08-05 | End: 2025-08-05

## 2025-08-05 RX ORDER — HALOPERIDOL 5 MG/1
5 TABLET ORAL EVERY 6 HOURS PRN
Status: DISCONTINUED | OUTPATIENT
Start: 2025-08-05 | End: 2025-08-08 | Stop reason: HOSPADM

## 2025-08-05 RX ORDER — ACETAMINOPHEN 325 MG/1
650 TABLET ORAL EVERY 4 HOURS PRN
Status: DISCONTINUED | OUTPATIENT
Start: 2025-08-05 | End: 2025-08-08 | Stop reason: HOSPADM

## 2025-08-05 RX ORDER — TRAZODONE HYDROCHLORIDE 50 MG/1
50 TABLET ORAL NIGHTLY PRN
Status: DISCONTINUED | OUTPATIENT
Start: 2025-08-05 | End: 2025-08-05

## 2025-08-05 RX ORDER — HALOPERIDOL 5 MG/ML
5 INJECTION INTRAMUSCULAR EVERY 6 HOURS PRN
Status: DISCONTINUED | OUTPATIENT
Start: 2025-08-05 | End: 2025-08-08 | Stop reason: HOSPADM

## 2025-08-05 RX ORDER — HALOPERIDOL 5 MG/ML
5 INJECTION INTRAMUSCULAR EVERY 6 HOURS PRN
Status: DISCONTINUED | OUTPATIENT
Start: 2025-08-05 | End: 2025-08-05

## 2025-08-05 RX ADMIN — TRAZODONE HYDROCHLORIDE 50 MG: 50 TABLET ORAL at 20:10

## 2025-08-05 RX ADMIN — LORAZEPAM 1 MG: 1 TABLET ORAL at 13:42

## 2025-08-05 RX ADMIN — HYDROXYZINE PAMOATE 50 MG: 50 CAPSULE ORAL at 20:10

## 2025-08-05 ASSESSMENT — PATIENT HEALTH QUESTIONNAIRE - PHQ9
1. LITTLE INTEREST OR PLEASURE IN DOING THINGS: NEARLY EVERY DAY
6. FEELING BAD ABOUT YOURSELF - OR THAT YOU ARE A FAILURE OR HAVE LET YOURSELF OR YOUR FAMILY DOWN: NEARLY EVERY DAY
9. THOUGHTS THAT YOU WOULD BE BETTER OFF DEAD, OR OF HURTING YOURSELF: NEARLY EVERY DAY
4. FEELING TIRED OR HAVING LITTLE ENERGY: NEARLY EVERY DAY
3. TROUBLE FALLING OR STAYING ASLEEP: NEARLY EVERY DAY
SUM OF ALL RESPONSES TO PHQ QUESTIONS 1-9: 27
2. FEELING DOWN, DEPRESSED OR HOPELESS: NEARLY EVERY DAY
SUM OF ALL RESPONSES TO PHQ QUESTIONS 1-9: 24
5. POOR APPETITE OR OVEREATING: NEARLY EVERY DAY
7. TROUBLE CONCENTRATING ON THINGS, SUCH AS READING THE NEWSPAPER OR WATCHING TELEVISION: NEARLY EVERY DAY
SUM OF ALL RESPONSES TO PHQ QUESTIONS 1-9: 27
8. MOVING OR SPEAKING SO SLOWLY THAT OTHER PEOPLE COULD HAVE NOTICED. OR THE OPPOSITE, BEING SO FIGETY OR RESTLESS THAT YOU HAVE BEEN MOVING AROUND A LOT MORE THAN USUAL: NEARLY EVERY DAY
SUM OF ALL RESPONSES TO PHQ QUESTIONS 1-9: 27
10. IF YOU CHECKED OFF ANY PROBLEMS, HOW DIFFICULT HAVE THESE PROBLEMS MADE IT FOR YOU TO DO YOUR WORK, TAKE CARE OF THINGS AT HOME, OR GET ALONG WITH OTHER PEOPLE: EXTREMELY DIFFICULT

## 2025-08-05 ASSESSMENT — ENCOUNTER SYMPTOMS
DIARRHEA: 0
VOMITING: 0
COUGH: 0
ABDOMINAL PAIN: 0
PHOTOPHOBIA: 0
SHORTNESS OF BREATH: 0
NAUSEA: 0

## 2025-08-05 ASSESSMENT — SLEEP AND FATIGUE QUESTIONNAIRES
DO YOU USE A SLEEP AID: YES
SLEEP PATTERN: DISTURBED/INTERRUPTED SLEEP
DO YOU HAVE DIFFICULTY SLEEPING: YES

## 2025-08-05 ASSESSMENT — PAIN - FUNCTIONAL ASSESSMENT: PAIN_FUNCTIONAL_ASSESSMENT: NONE - DENIES PAIN

## 2025-08-06 LAB
EKG ATRIAL RATE: 63 BPM
EKG DIAGNOSIS: NORMAL
EKG P AXIS: 37 DEGREES
EKG P-R INTERVAL: 160 MS
EKG Q-T INTERVAL: 422 MS
EKG QRS DURATION: 84 MS
EKG QTC CALCULATION (BAZETT): 431 MS
EKG R AXIS: 63 DEGREES
EKG T AXIS: 67 DEGREES
EKG VENTRICULAR RATE: 63 BPM
ESTIMATED AVERAGE GLUCOSE: 103 MG/DL
HBA1C MFR BLD: 5.2 % (ref 4–6)

## 2025-08-06 PROCEDURE — 1240000000 HC EMOTIONAL WELLNESS R&B

## 2025-08-06 PROCEDURE — 6370000000 HC RX 637 (ALT 250 FOR IP): Performed by: PSYCHIATRY & NEUROLOGY

## 2025-08-06 RX ORDER — ARIPIPRAZOLE 5 MG/1
5 TABLET ORAL DAILY
Status: DISCONTINUED | OUTPATIENT
Start: 2025-08-06 | End: 2025-08-08 | Stop reason: HOSPADM

## 2025-08-06 RX ORDER — BUPRENORPHINE AND NALOXONE 8; 2 MG/1; MG/1
1 FILM, SOLUBLE BUCCAL; SUBLINGUAL 3 TIMES DAILY
COMMUNITY

## 2025-08-06 RX ORDER — MIRTAZAPINE 15 MG/1
15 TABLET, FILM COATED ORAL NIGHTLY
Status: DISCONTINUED | OUTPATIENT
Start: 2025-08-06 | End: 2025-08-08 | Stop reason: HOSPADM

## 2025-08-06 RX ADMIN — ACETAMINOPHEN 650 MG: 325 TABLET ORAL at 15:16

## 2025-08-06 RX ADMIN — TRAZODONE HYDROCHLORIDE 50 MG: 50 TABLET ORAL at 20:52

## 2025-08-06 RX ADMIN — ARIPIPRAZOLE 5 MG: 5 TABLET ORAL at 08:50

## 2025-08-06 RX ADMIN — HYDROXYZINE PAMOATE 50 MG: 50 CAPSULE ORAL at 15:16

## 2025-08-06 RX ADMIN — HYDROXYZINE PAMOATE 50 MG: 50 CAPSULE ORAL at 20:52

## 2025-08-06 RX ADMIN — HYDROXYZINE PAMOATE 50 MG: 50 CAPSULE ORAL at 08:59

## 2025-08-06 RX ADMIN — MIRTAZAPINE 15 MG: 15 TABLET, FILM COATED ORAL at 20:52

## 2025-08-06 ASSESSMENT — PATIENT HEALTH QUESTIONNAIRE - PHQ9
2. FEELING DOWN, DEPRESSED OR HOPELESS: NEARLY EVERY DAY
1. LITTLE INTEREST OR PLEASURE IN DOING THINGS: NEARLY EVERY DAY
10. IF YOU CHECKED OFF ANY PROBLEMS, HOW DIFFICULT HAVE THESE PROBLEMS MADE IT FOR YOU TO DO YOUR WORK, TAKE CARE OF THINGS AT HOME, OR GET ALONG WITH OTHER PEOPLE: EXTREMELY DIFFICULT
4. FEELING TIRED OR HAVING LITTLE ENERGY: NEARLY EVERY DAY
8. MOVING OR SPEAKING SO SLOWLY THAT OTHER PEOPLE COULD HAVE NOTICED. OR THE OPPOSITE, BEING SO FIGETY OR RESTLESS THAT YOU HAVE BEEN MOVING AROUND A LOT MORE THAN USUAL: NEARLY EVERY DAY
3. TROUBLE FALLING OR STAYING ASLEEP: NEARLY EVERY DAY
7. TROUBLE CONCENTRATING ON THINGS, SUCH AS READING THE NEWSPAPER OR WATCHING TELEVISION: NEARLY EVERY DAY
5. POOR APPETITE OR OVEREATING: NEARLY EVERY DAY
SUM OF ALL RESPONSES TO PHQ QUESTIONS 1-9: 27
6. FEELING BAD ABOUT YOURSELF - OR THAT YOU ARE A FAILURE OR HAVE LET YOURSELF OR YOUR FAMILY DOWN: NEARLY EVERY DAY
9. THOUGHTS THAT YOU WOULD BE BETTER OFF DEAD, OR OF HURTING YOURSELF: NEARLY EVERY DAY
SUM OF ALL RESPONSES TO PHQ QUESTIONS 1-9: 27
SUM OF ALL RESPONSES TO PHQ QUESTIONS 1-9: 24
SUM OF ALL RESPONSES TO PHQ QUESTIONS 1-9: 27

## 2025-08-06 ASSESSMENT — LIFESTYLE VARIABLES
HOW MANY STANDARD DRINKS CONTAINING ALCOHOL DO YOU HAVE ON A TYPICAL DAY: PATIENT DOES NOT DRINK
HOW OFTEN DO YOU HAVE A DRINK CONTAINING ALCOHOL: NEVER

## 2025-08-07 PROCEDURE — 1240000000 HC EMOTIONAL WELLNESS R&B

## 2025-08-07 PROCEDURE — 6370000000 HC RX 637 (ALT 250 FOR IP): Performed by: PSYCHIATRY & NEUROLOGY

## 2025-08-07 RX ORDER — BUSPIRONE HYDROCHLORIDE 5 MG/1
5 TABLET ORAL 2 TIMES DAILY
Status: DISCONTINUED | OUTPATIENT
Start: 2025-08-07 | End: 2025-08-08 | Stop reason: HOSPADM

## 2025-08-07 RX ADMIN — BUSPIRONE HYDROCHLORIDE 5 MG: 5 TABLET ORAL at 08:45

## 2025-08-07 RX ADMIN — TRAZODONE HYDROCHLORIDE 50 MG: 50 TABLET ORAL at 20:50

## 2025-08-07 RX ADMIN — BUSPIRONE HYDROCHLORIDE 5 MG: 5 TABLET ORAL at 20:49

## 2025-08-07 RX ADMIN — ARIPIPRAZOLE 5 MG: 5 TABLET ORAL at 08:44

## 2025-08-07 RX ADMIN — HYDROXYZINE PAMOATE 50 MG: 50 CAPSULE ORAL at 20:50

## 2025-08-07 RX ADMIN — HYDROXYZINE PAMOATE 50 MG: 50 CAPSULE ORAL at 14:32

## 2025-08-07 RX ADMIN — MIRTAZAPINE 15 MG: 15 TABLET, FILM COATED ORAL at 20:49

## 2025-08-08 VITALS
BODY MASS INDEX: 27.86 KG/M2 | HEART RATE: 66 BPM | HEIGHT: 73 IN | DIASTOLIC BLOOD PRESSURE: 89 MMHG | SYSTOLIC BLOOD PRESSURE: 139 MMHG | RESPIRATION RATE: 18 BRPM | OXYGEN SATURATION: 100 % | WEIGHT: 210.2 LBS | TEMPERATURE: 98.4 F

## 2025-08-08 PROCEDURE — 6370000000 HC RX 637 (ALT 250 FOR IP): Performed by: PSYCHIATRY & NEUROLOGY

## 2025-08-08 RX ORDER — MIRTAZAPINE 15 MG/1
15 TABLET, FILM COATED ORAL NIGHTLY
Qty: 15 TABLET | Refills: 1 | Status: SHIPPED | OUTPATIENT
Start: 2025-08-08

## 2025-08-08 RX ORDER — BUSPIRONE HYDROCHLORIDE 5 MG/1
5 TABLET ORAL 2 TIMES DAILY
Qty: 30 TABLET | Refills: 1 | Status: SHIPPED | OUTPATIENT
Start: 2025-08-08

## 2025-08-08 RX ORDER — ARIPIPRAZOLE 5 MG/1
5 TABLET ORAL DAILY
Qty: 15 TABLET | Refills: 3 | Status: SHIPPED | OUTPATIENT
Start: 2025-08-09

## 2025-08-08 RX ADMIN — ARIPIPRAZOLE 5 MG: 5 TABLET ORAL at 08:03

## 2025-08-08 RX ADMIN — BUSPIRONE HYDROCHLORIDE 5 MG: 5 TABLET ORAL at 08:03

## 2025-08-08 RX ADMIN — HYDROXYZINE PAMOATE 50 MG: 50 CAPSULE ORAL at 08:26

## 2025-09-22 ENCOUNTER — APPOINTMENT (OUTPATIENT)
Dept: PRIMARY CARE | Facility: CLINIC | Age: 40
End: 2025-09-22
Payer: MEDICAID